# Patient Record
Sex: MALE | Race: WHITE | Employment: OTHER | URBAN - NONMETROPOLITAN AREA
[De-identification: names, ages, dates, MRNs, and addresses within clinical notes are randomized per-mention and may not be internally consistent; named-entity substitution may affect disease eponyms.]

---

## 2023-03-17 ENCOUNTER — APPOINTMENT (OUTPATIENT)
Dept: MRI IMAGING | Age: 68
End: 2023-03-17
Payer: OTHER GOVERNMENT

## 2023-03-17 ENCOUNTER — APPOINTMENT (OUTPATIENT)
Dept: CT IMAGING | Age: 68
End: 2023-03-17
Payer: OTHER GOVERNMENT

## 2023-03-17 ENCOUNTER — APPOINTMENT (OUTPATIENT)
Dept: GENERAL RADIOLOGY | Age: 68
End: 2023-03-17
Payer: OTHER GOVERNMENT

## 2023-03-17 ENCOUNTER — HOSPITAL ENCOUNTER (EMERGENCY)
Age: 68
Discharge: HOME | End: 2023-03-17
Attending: EMERGENCY MEDICINE
Payer: OTHER GOVERNMENT

## 2023-03-17 VITALS
WEIGHT: 150 LBS | HEART RATE: 75 BPM | OXYGEN SATURATION: 97 % | HEIGHT: 69 IN | DIASTOLIC BLOOD PRESSURE: 84 MMHG | TEMPERATURE: 97.9 F | SYSTOLIC BLOOD PRESSURE: 151 MMHG | RESPIRATION RATE: 12 BRPM | BODY MASS INDEX: 22.22 KG/M2

## 2023-03-17 DIAGNOSIS — R20.2 PARESTHESIA: Primary | ICD-10-CM

## 2023-03-17 LAB
ABSOLUTE EOS #: 0.31 K/UL (ref 0–0.44)
ABSOLUTE IMMATURE GRANULOCYTE: 0.04 K/UL (ref 0–0.3)
ABSOLUTE LYMPH #: 1.87 K/UL (ref 1.1–3.7)
ABSOLUTE MONO #: 0.56 K/UL (ref 0.1–1.2)
ALBUMIN SERPL-MCNC: 4.1 G/DL (ref 3.5–5.2)
ALBUMIN/GLOBULIN RATIO: 1.4 (ref 1–2.5)
ALP SERPL-CCNC: 100 U/L (ref 40–129)
ALT SERPL-CCNC: 7 U/L (ref 5–41)
ANION GAP SERPL CALCULATED.3IONS-SCNC: 7 MMOL/L (ref 9–17)
AST SERPL-CCNC: 24 U/L
BASOPHILS # BLD: 1 % (ref 0–2)
BASOPHILS ABSOLUTE: 0.05 K/UL (ref 0–0.2)
BILIRUB SERPL-MCNC: 0.4 MG/DL (ref 0.3–1.2)
BUN SERPL-MCNC: 12 MG/DL (ref 8–23)
BUN/CREAT BLD: 13 (ref 9–20)
CALCIUM SERPL-MCNC: 9.5 MG/DL (ref 8.6–10.4)
CHLORIDE SERPL-SCNC: 99 MMOL/L (ref 98–107)
CO2 SERPL-SCNC: 28 MMOL/L (ref 20–31)
CREAT SERPL-MCNC: 0.9 MG/DL (ref 0.7–1.2)
EKG ATRIAL RATE: 85 BPM
EKG P AXIS: 78 DEGREES
EKG P-R INTERVAL: 132 MS
EKG Q-T INTERVAL: 372 MS
EKG QRS DURATION: 68 MS
EKG QTC CALCULATION (BAZETT): 442 MS
EKG R AXIS: 63 DEGREES
EKG T AXIS: 44 DEGREES
EKG VENTRICULAR RATE: 85 BPM
EOSINOPHILS RELATIVE PERCENT: 4 % (ref 1–4)
GFR SERPL CREATININE-BSD FRML MDRD: >60 ML/MIN/1.73M2
GLUCOSE SERPL-MCNC: 100 MG/DL (ref 70–99)
HCT VFR BLD AUTO: 44.3 % (ref 40.7–50.3)
HGB BLD-MCNC: 15.2 G/DL (ref 13–17)
IMMATURE GRANULOCYTES: 1 %
LYMPHOCYTES # BLD: 25 % (ref 24–43)
MAGNESIUM SERPL-MCNC: 2 MG/DL (ref 1.6–2.6)
MCH RBC QN AUTO: 36.9 PG (ref 25.2–33.5)
MCHC RBC AUTO-ENTMCNC: 34.3 G/DL (ref 25.2–33.5)
MCV RBC AUTO: 107.5 FL (ref 82.6–102.9)
MONOCYTES # BLD: 8 % (ref 3–12)
NRBC AUTOMATED: 0 PER 100 WBC
PDW BLD-RTO: 13.2 % (ref 11.8–14.4)
PLATELET # BLD AUTO: 193 K/UL (ref 138–453)
PMV BLD AUTO: 9.8 FL (ref 8.1–13.5)
POTASSIUM SERPL-SCNC: 4.6 MMOL/L (ref 3.7–5.3)
PROT SERPL-MCNC: 7 G/DL (ref 6.4–8.3)
RBC # BLD: 4.12 M/UL (ref 4.21–5.77)
RBC # BLD: ABNORMAL 10*6/UL
SEG NEUTROPHILS: 61 % (ref 36–65)
SEGMENTED NEUTROPHILS ABSOLUTE COUNT: 4.56 K/UL (ref 1.5–8.1)
SODIUM SERPL-SCNC: 134 MMOL/L (ref 135–144)
TROPONIN I SERPL DL<=0.01 NG/ML-MCNC: <6 NG/L (ref 0–22)
WBC # BLD AUTO: 7.4 K/UL (ref 3.5–11.3)

## 2023-03-17 PROCEDURE — 6360000004 HC RX CONTRAST MEDICATION: Performed by: EMERGENCY MEDICINE

## 2023-03-17 PROCEDURE — 93005 ELECTROCARDIOGRAM TRACING: CPT | Performed by: EMERGENCY MEDICINE

## 2023-03-17 PROCEDURE — 2580000003 HC RX 258: Performed by: EMERGENCY MEDICINE

## 2023-03-17 PROCEDURE — 80053 COMPREHEN METABOLIC PANEL: CPT

## 2023-03-17 PROCEDURE — 99285 EMERGENCY DEPT VISIT HI MDM: CPT

## 2023-03-17 PROCEDURE — 71045 X-RAY EXAM CHEST 1 VIEW: CPT

## 2023-03-17 PROCEDURE — 85025 COMPLETE CBC W/AUTO DIFF WBC: CPT

## 2023-03-17 PROCEDURE — 36415 COLL VENOUS BLD VENIPUNCTURE: CPT

## 2023-03-17 PROCEDURE — 84484 ASSAY OF TROPONIN QUANT: CPT

## 2023-03-17 PROCEDURE — 70551 MRI BRAIN STEM W/O DYE: CPT

## 2023-03-17 PROCEDURE — 6370000000 HC RX 637 (ALT 250 FOR IP): Performed by: EMERGENCY MEDICINE

## 2023-03-17 PROCEDURE — 2709999900 CTA HEAD NECK W CONTRAST

## 2023-03-17 PROCEDURE — 83735 ASSAY OF MAGNESIUM: CPT

## 2023-03-17 PROCEDURE — 70450 CT HEAD/BRAIN W/O DYE: CPT

## 2023-03-17 RX ORDER — ASPIRIN 81 MG/1
324 TABLET, CHEWABLE ORAL ONCE
Status: COMPLETED | OUTPATIENT
Start: 2023-03-17 | End: 2023-03-17

## 2023-03-17 RX ORDER — ACETAMINOPHEN 325 MG/1
650 TABLET ORAL EVERY 6 HOURS PRN
COMMUNITY

## 2023-03-17 RX ORDER — 0.9 % SODIUM CHLORIDE 0.9 %
500 INTRAVENOUS SOLUTION INTRAVENOUS ONCE
Status: COMPLETED | OUTPATIENT
Start: 2023-03-17 | End: 2023-03-17

## 2023-03-17 RX ORDER — ASPIRIN 81 MG/1
81 TABLET ORAL DAILY
Qty: 30 TABLET | Refills: 0 | Status: SHIPPED | OUTPATIENT
Start: 2023-03-17

## 2023-03-17 RX ORDER — CLOPIDOGREL BISULFATE 75 MG/1
300 TABLET ORAL ONCE
Status: COMPLETED | OUTPATIENT
Start: 2023-03-17 | End: 2023-03-17

## 2023-03-17 RX ADMIN — IOPAMIDOL 75 ML: 755 INJECTION, SOLUTION INTRAVENOUS at 12:22

## 2023-03-17 RX ADMIN — CLOPIDOGREL BISULFATE 300 MG: 75 TABLET ORAL at 19:25

## 2023-03-17 RX ADMIN — SODIUM CHLORIDE 500 ML: 9 INJECTION, SOLUTION INTRAVENOUS at 11:59

## 2023-03-17 RX ADMIN — ASPIRIN 81 MG 324 MG: 81 TABLET ORAL at 12:00

## 2023-03-17 ASSESSMENT — ENCOUNTER SYMPTOMS
SHORTNESS OF BREATH: 0
SORE THROAT: 0
VOMITING: 0
DIARRHEA: 0

## 2023-03-17 ASSESSMENT — PAIN - FUNCTIONAL ASSESSMENT
PAIN_FUNCTIONAL_ASSESSMENT: NONE - DENIES PAIN
PAIN_FUNCTIONAL_ASSESSMENT: NONE - DENIES PAIN

## 2023-03-17 ASSESSMENT — LIFESTYLE VARIABLES: HOW OFTEN DO YOU HAVE A DRINK CONTAINING ALCOHOL: NEVER

## 2023-03-17 NOTE — VIRTUAL HEALTH
Consults    Jian Harvey, was evaluated through a synchronous (real-time) audio-video encounter. The patient (and/or guardian if applicable) is aware that this is a billable service, which includes applicable co-pays. This virtual visit was conducted with patient's (and/or legal guardian's) consent. Patient identification was verified, and a caregiver was present when appropriate. The patient was located at [place] [address] ([address] [place] [address]): [address] [facility]  [address]  [facility] [address]  Loc: [phone]  The provider was located at [address] [facility] Dept): [facility]  [address] [place] [id number] [phone]     Total time spent on this encounter:  25    --Samuel Rodriguez MD on 3/17/2023 at 7:14 PM    An electronic signature was used to authenticate this note. [facility] AT [place] [facility] Consult for  [facility] Stroke Alert through [address] @ 1:11pm  3/17/2023 7:14 PM  Pt Name: Jian Harvey  MRN: [id number]  [place]: 1955  Date of evaluation: 3/17/2023  Primary Care Physician: Angela Lord 36  Reason for Evaluation: Stroke evaluation with Phone Consult, Discussion and Review of imaging    Jian Harvey is a 74 y.o. male c/o left arm tingling and headache while watching since 10:30am    LKW: 10:30am  NIH:  1    Allergies  has No Known Allergies. Medications  Prior to Admission medications    Medication Sig Start Date End Date Taking? Authorizing Provider   acetaminophen (TYLENOL) 325 MG tablet Take 650 mg by mouth every 6 hours as needed for Pain   Yes Historical Provider, MD   aspirin EC 81 MG EC tablet Take 1 tablet by mouth daily 3/17/23  Yes Ngozi Cabello, DO    Scheduled Meds:   clopidogrel  300 mg Oral Once     Continuous Infusions:  PRN Meds:.  Past Medical History   has a past medical history of DJD (degenerative joint disease) and Glaucoma.   Social History  Social History     Socioeconomic History    Marital status: Unknown     Spouse name: Not on file    Number of children: Not on file    Years of education: Not on file    Highest education level: Not on file   Occupational History    Not on file   Tobacco Use    Smoking status: Every Day     Packs/day: 2.00     Types: Cigarettes    Smokeless tobacco: Never   Vaping Use    Vaping Use: Never used   Substance and Sexual Activity    Alcohol use: Yes     Alcohol/week: 8.0 standard drinks     Types: 5 Cans of beer, 3 Shots of liquor per week     Comment: daily drinker    Drug use: Not on file    Sexual activity: Not on file   Other Topics Concern    Not on file   Social History Narrative    Not on file     Social Determinants of Health     Financial Resource Strain: Not on file   Food Insecurity: Not on file   Transportation Needs: Not on file   Physical Activity: Not on file   Stress: Not on file   Social Connections: Not on file   Intimate Partner Violence: Not on file   Housing Stability: Not on file     Family History  History reviewed. No pertinent family history. OBJECTIVE  BP (!) 151/84   Pulse 75   Temp 97.9 °F (36.6 °C) (Tympanic)   Resp 12   Ht 5' 9\" (1.753 m)   Wt 150 lb (68 kg)   SpO2 97%   BMI 22.15 kg/m²        Pre-Morbid mRS: 0    Imaging:  Images were personally reviewed with [facility]. AI used to review images including:  CT brain without contrast: nothing acute  CTA imaging: no LVO, no major stenosis      MRI brain: no acute stroke    Assessment    75 yo man with left arm tingling    Recommendations:  NIH 1  Recommend Outpatient Neurology Consult for further assessment and evaluation    consider . [id number]  This is not likely a stroke by exam, please get MRI brain to r/o , ok to give aspirin 325mg x 1 and plavix 300mg x 1 before MRI      Update addendum:  - MRI brain done and no acute stroke  - ok to d/c aspirin and plavix, unless pt needs it for other reasons.   - if symptoms persist, consider outpatient neurology        Discussed with ED Physician Dr Elpidio Lange        This is a Phone Consult, I have not seen the patient face to face, the telemedicine device was not utilized.     Adolfo Gaston MD, MD   Stroke, Neurocritical Care And/or [address] [place] [address] Stroke [address] [address] [place]  Electronically signed 3/17/2023 at 7:14 PM

## 2023-03-17 NOTE — ED PROVIDER NOTES
[facility] ED  [address]  Phone: [facility]  EMERGENCY DEPARTMENT ENCOUNTER      Pt Name: Emile Anthony  MRN: [id number]  [place] 1955  Date of evaluation: 3/17/2023  Provider: Nu Stapleton DO    CHIEF COMPLAINT       Chief Complaint   Patient presents with    Arm Pain     Pt c/o left arm tingling with pain, head pressure, resolved left sided CP         HISTORY OF PRESENT ILLNESS   (Location/Symptom, Timing/Onset,Context/Setting, Quality, Duration, Modifying Factors, Severity)  Note limiting factors. Emile Anthony is a 74 y.o. male who presents to the emergency department evaluation of some tingling in the left arm. Patient reports couple hours ago he was sitting in his garage watching TV when he noticed some tingling in his left arm, mild headache and a twinge of pain in his left lateral chest wall. Patient reports the pain in the left lateral chest wall has resolved but he still has a mild headache and some tingling in his left arm. No difficulty speaking or swallowing. No change in vision. No numbness or weakness in his arms or legs. No history of heart attack, heart disease or stroke. Patient has no prescribed medications. Patient does smoke. Nursing Notes were reviewed. REVIEW OF SYSTEMS    (2-9systems for level 4, 10 or more for level 5)     Review of Systems   Constitutional:  Negative for fever. HENT:  Negative for sore throat. Respiratory:  Negative for shortness of breath. Cardiovascular:  Negative for chest pain. Gastrointestinal:  Negative for diarrhea and vomiting. Genitourinary:  Negative for dysuria. Musculoskeletal:         Tingling in the left arm   Skin:  Negative for rash. Neurological:  Positive for headaches. Negative for weakness. All other systems reviewed and are negative. Except asnoted above the remainder of the review of systems was reviewed and negative. PAST MEDICAL HISTORY     Past Medical History:   Diagnosis Date    DJD (degenerative joint disease)     Glaucoma          SURGICAL HISTORY     History reviewed. No pertinent surgical history. CURRENT MEDICATIONS     Previous Medications    ACETAMINOPHEN (TYLENOL) 325 MG TABLET    Take 650 mg by mouth every 6 hours as needed for Pain       ALLERGIES     Patient has no known allergies. FAMILY HISTORY     History reviewed. No pertinent family history. SOCIAL HISTORY       Social History     Socioeconomic History    Marital status: Unknown     Spouse name: None    Number of children: None    Years of education: None    Highest education level: None   Tobacco Use    Smoking status: Every Day     Packs/day: 2.00     Types: Cigarettes    Smokeless tobacco: Never   Vaping Use    Vaping Use: Never used   Substance and Sexual Activity    Alcohol use: Yes     Alcohol/week: 8.0 standard drinks     Types: 5 Cans of beer, 3 Shots of liquor per week     Comment: daily drinker       SCREENINGS    Kei Coma Scale  Eye Opening: Spontaneous  Best Verbal Response: Oriented  Best Motor Response: Obeys commands  [place] Coma Scale Score: 15        PHYSICAL EXAM    (up to 7 for level 4, 8 or more for level 5)     ED Triage Vitals   BP Temp Temp src Pulse Resp SpO2 Height Weight   -- -- -- -- -- -- -- --       Physical Exam  Vitals and nursing note reviewed. Constitutional:       General: He is not in acute distress. Appearance: Normal appearance. He is not ill-appearing or toxic-appearing. HENT:      Head: Normocephalic and atraumatic. Nose: Nose normal. No congestion. Mouth/Throat:      Mouth: Mucous membranes are moist.   Eyes:      General:         Right eye: No discharge. Left eye: No discharge. Conjunctiva/sclera: Conjunctivae normal.   Cardiovascular:      Rate and Rhythm: Normal rate and regular rhythm. Pulses: Normal pulses. Heart sounds: Normal heart sounds.  No murmur heard. Pulmonary:      Effort: Pulmonary effort is normal. No respiratory distress. Breath sounds: Normal breath sounds. No wheezing. Abdominal:      General: Abdomen is flat. There is no distension. Palpations: Abdomen is soft. There is no pulsatile mass. Tenderness: There is no abdominal tenderness. There is no guarding or rebound. Comments: No pulsatile mass   Musculoskeletal:         General: No deformity or signs of injury. Normal range of motion. Cervical back: Normal range of motion. Skin:     General: Skin is warm and dry. Capillary Refill: Capillary refill takes less than 2 seconds. Findings: No rash. Neurological:      General: No focal deficit present. Mental Status: He is alert and oriented to person, place, and time. Mental status is at baseline. Sensory: No sensory deficit. Motor: No weakness. Comments: Speaking normally. No facial asymmetry. Moving all 4 extremities. Normal gait. No visual field deficits. Extraocular movements intact. There is no horizontal or vertical nystagmus. Pt can raise eyebrows, close eyes tight and puff out cheeks. Hearing intact. Uvula midline and no difficulty swallowing. Can rotate head side to side and shrug shoulders. The patient has a normal finger to nose exam performed at bedside. Psychiatric:         Mood and Affect: Mood normal.       EMERGENCY DEPARTMENT COURSE and DIFFERENTIAL DIAGNOSIS/MDM:   Vitals:    Vitals:    03/17/23 1315 03/17/23 1400 03/17/23 1540 03/17/23 1711   BP: (!) 157/87 (!) 156/96  (!) 151/84   Pulse: 84 76 70 75   Resp: 12 11  12   Temp:       TempSrc:       SpO2: 97% 95%  97%   Weight:       Height:           Patient presents to the emergency department with the complaint described above. Vital signs are showing hypertension but otherwise grossly unremarkable. Physical examination did not reveal any focal neurologic deficit or obvious abnormality.   At this time, differential diagnosis is broad but will do a thorough neurologic evaluation with CT head without contrast and CTA. Will obtain EKG and routine blood work that includes a troponin. Giving single dose of aspirin and I will reevaluate      DIAGNOSTIC RESULTS     LABS:  Labs Reviewed   CBC WITH AUTO DIFFERENTIAL - Abnormal; Notable for the following components:       Result Value    RBC 4.12 (*)     .5 (*)     MCH 36.9 (*)     MCHC 34.3 (*)     Immature Granulocytes 1 (*)     All other components within normal limits   COMPREHENSIVE METABOLIC PANEL - Abnormal; Notable for the following components:    Glucose 100 (*)     Sodium 134 (*)     Anion Gap 7 (*)     All other components within normal limits   MAGNESIUM   TROPONIN       All other labs were within normal range or not returned as of this dictation. RADIOLOGY:  MRI BRAIN WO CONTRAST   Final Result   No acute infarct. XR CHEST PORTABLE   Final Result   1. Old granulomatous disease. 2. No acute focal airspace consolidation or pleural effusions. CTA HEAD NECK W CONTRAST   Final Result   1. No acute intracranial abnormality. 2. No significant stenosis or evidence for occlusion. CT HEAD WO CONTRAST   Final Result   1. No acute intracranial abnormality. 2. No significant stenosis or evidence for occlusion. ED Course as of 03/17/23 1914   Fri Mar 17, 2023   1210 Twelve lead EKG interpreted by myself:  A 12 lead EKG done at 1208, interpreted by myself, showed a regular rhythm at a rate of 85bpm.  The NC interval was normal.  The QRS complex was normal.  There was no ST segment elevation or depression, T wave inversion not present.   QRS progression through precordial leads was grossly normal.  Interpretation: Normal sinus rhythm, no ST segment changes and no pattern consistent with acute ischemia or infarct [TS]   1257 Patient's CTA is unremarkable, laboratory results showed slight macrocytosis but otherwise unremarkable. Troponin negative [TS]   1258 Will have evaluation by on-call stroke physician and reassess -still reports tingling in left arm sort of from the elbow down, all 5 fingers [TS]   1318 I spoke with Dr. Tino Lemus and he is requesting MRI. States that the CTA looks relatively good, minimal carotid stenosis, recommends full dose aspirin and Plavix and MRI, if MRI clean, patient can go home. We will see if we can evaluate and obtain MRI [TS]   1910 MRI of brain revealed no acute infarct. Dr. Tino Lemus had stated that patient could be discharged on low-dose aspirin if there was no acute infarct. Patient symptoms have also resolved. I am going to give him follow-up information for PCP as well as Dr. Kwaku Galvan and I have talked about follow-up and what to return to ER for. At this time the patient is without objective evidence of an acute process requiring hospitalization or inpatient management. They have remained hemodynamically stable and are stable for discharge with outpatient follow-up. Standard anticipatory guidance given to patient upon discharge. Have given them a specific time frame in which to follow-up and who to follow-up with. I have also advised them that they should return to the emergency department if they get worse, or not getting better or develop any new or concerning symptoms. Patient demonstrates understanding.   [TS]      ED Course User Index  [TS] Uma Cavanaugh DO         PROCEDURES:  Unless otherwise noted below, none     Procedures    FINAL IMPRESSION      1.  Paresthesia          DISPOSITION/PLAN   DISPOSITION Decision To Discharge 03/17/2023 07:11:20 PM      PATIENT REFERRED TO:  Romana Billow, [address]  [address] [facility]    In 3 days      Kerry Arguelles, [address] [place] [address] [facility]  [phone]    Call in 3 days      DISCHARGE MEDICATIONS:  New Prescriptions    ASPIRIN EC 81 MG EC TABLET    Take 1 tablet by mouth daily (Please note that portions of this note were completed with a voice recognition program.  Efforts were made to edit the dictations but occasionally words are mis-transcribed.)    Sushila Savage DO,(electronically signed)  Board Certified Emergency Physician          Sushila Savage DO  03/17/23 1914

## 2023-03-17 NOTE — DISCHARGE INSTRUCTIONS
Start low-dose aspirin. Follow-up with both your primary care doctor and vascular neurology. Call both offices on Monday to set up appointments    Please make an appointment to follow up with your primary doctor and/or the specialist as we discussed. Take all medications as prescribed. Return to ER if condition worsens or you develop any new/concerning symptoms as we discussed.